# Patient Record
Sex: FEMALE | Race: WHITE
[De-identification: names, ages, dates, MRNs, and addresses within clinical notes are randomized per-mention and may not be internally consistent; named-entity substitution may affect disease eponyms.]

---
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
## 2020-03-08 NOTE — EDPHYS
Physician Documentation                                                                           

 [facility]                                                                 

Name: Cady Jason                                                                                   

Age: 19 yrs                                                                                       

Sex: Female                                                                                       

: 2000                                                                                   

MRN: [id number]                                                                                   

Arrival Date: 2020                                                                          

Time: 12:24                                                                                       

Account#: [id number]                                                                            

Bed 20                                                                                            

Private MD:                                                                                       

ED Physician Anant Alonso                                                                         

HPI:                                                                                              

                                                                                             

12:58 This 19 yrs old  Female presents to ER via Ambulatory with complaints of Mouth la1 

      Problem.                                                                                    

12:58 The patient presents with pain. The problem is located in the upper left third molar    la1 

      and lower left third molar. Onset: The symptoms/episode began/occurred yesterday.           

      Duration: The symptoms are continuous. Modifying factors: The symptoms are alleviated       

      by nothing, the symptoms are aggravated by chewing, swallowing. Associated signs and        

      symptoms: Pertinent positives: pain, Pertinent negatives: chills, fever, inability to       

      eat, nausea. Severity of symptoms: At their worst the symptoms were mild. The patient       

      has not experienced similar symptoms in the past.                                           

                                                                                                  

Historical:                                                                                       

- Allergies:                                                                                      

12:44 No Known Allergies;                                                                     ph  

- PMHx:                                                                                           

12:44 ADD/ADHD; PCOS; Bipolar disorder; Schizophrenia;                                        ph  

- PSHx:                                                                                           

12:44 None;                                                                                   ph  

                                                                                                  

- Immunization history:: Adult Immunizations unknown.                                             

- Social history:: Smoking status: Patient reports the use of cigarette tobacco                   

  products, smokes one pack cigarettes per day.                                                   

                                                                                                  

                                                                                                  

ROS:                                                                                              

12:59 Constitutional: Negative for fever, chills, and weight loss, Eyes: Negative for injury, la1 

      pain, redness, and discharge.                                                               

12:59 Neck: Negative for injury, pain, and swelling, Cardiovascular: Negative for chest pain,     

      palpitations, and edema, Respiratory: Negative for shortness of breath, cough,              

      wheezing, and pleuritic chest pain, Abdomen/GI: Negative for abdominal pain, nausea,        

      vomiting, diarrhea, and constipation, MS/Extremity: Negative for injury and deformity,      

      Skin: Negative for injury, rash, and discoloration, Neuro: Negative for headache,           

      weakness, numbness, tingling, and seizure.                                                  

12:59 ENT: Positive for dental pain.                                                              

                                                                                                  

Exam:                                                                                             

13:00 Constitutional:  This is a well developed, well nourished patient who is awake, alert,  la1 

      and in no acute distress. Head/Face:  Normocephalic, atraumatic.                            

13:00 Neck:  Trachea midline, no cervical lymphadenopathy.  Supple, full range of motion          

      without nuchal rigidity, or vertebral point tenderness.  No Meningismus. Respiratory:       

       No increased work of breathing.                                                            

13:00 ENT: Ear canal(s): are normal, clear, TM's: are normal, Mouth: Lips: normal, Oral           

      mucosa: normal, pink and intact, Gums: normal with healthy appearance, Tongue: is           

      normal, abscess, is not appreciated, drooling, is not appreciated, mild trismus, still      

      able to open enough for exam and PO food/water, Posterior pharynx: Airway: normal,          

      Tonsils: are normal in appearance, Uvula: normal, midline, swelling, is not                 

      appreciated, erythema, is not appreciated, peritonsillar mass, is not appreciated,          

      pooling of secretions, is not appreciated, Dental exam: pain, that is moderate,             

      specifically in the  upper left third molar (#16) and lower left third molar (#17),         

      Voice: is normal.                                                                           

                                                                                                  

Vital Signs:                                                                                      

12:42  / 87; Pulse 93; Resp 16; Temp 98.4; Pulse Ox 97% on R/A; Weight 56.7 kg; Height  ph  

      4 ft. 10 in. (147.32 cm);                                                                   

12:45  / 71; Pulse 93; Resp 17; Temp 98.0(O); Pulse Ox 98% on R/A; Weight 54.43 kg (R); rb1 

      Height 5 ft. 2 in. (157.48 cm) (R); Pain 10/10;                                             

13:30  / 91; Pulse 94; Resp 17; Pulse Ox 99% ; Pain 10/10;                              rb1 

12:45 Body Mass Index 21.95 (54.43 kg, 157.48 cm)                                             rb1 

                                                                                                  

MDM:                                                                                              

12:50 Patient medically screened.                                                             la1 

13:01 Data reviewed: vital signs, nurses notes. Counseling: I had a detailed discussion with  la1 

      the patient and/or guardian regarding: the historical points, exam findings, and any        

      diagnostic results supporting the discharge/admit diagnosis, the need for outpatient        

      follow up, a dentist, to return to the emergency department if symptoms worsen or           

      persist or if there are any questions or concerns that arise at home. Special               

      discussion: Based on the history and exam findings, there is no indication for further      

      emergent testing or inpatient evaluation. I discussed with the patient/guardian the         

      need to see a dentist for further evaluation of the symptoms. ED course: patient with       

      pain in left upper and lower jaw. Tenderness to palpation of external left cheek,           

      concern for early infection/abscess formation although pain could be from wisdom teeth      

      eruption. Due to mild trismus and tenderness I will treat with abx and recommend FU         

      with dentist. Pt does not have and submandibular erythema or edema, no sublingual           

      edema..                                                                                     

                                                                                                  

Administered Medications:                                                                         

13:37 Drug: Ibuprofen 600 mg Route: PO;                                                       rb1 

13:40 Follow up: Response: Medication administered at discharge.                              rb1 

                                                                                                  

                                                                                                  

Disposition:                                                                                      

14:19 Co-signature as Attending Physician, Anant Alonso MD.                                    rn  

                                                                                                  

Disposition:                                                                                      

20 13:04 Discharged to Home. Impression: Dental pain.                                       

- Condition is Stable.                                                                            

- Discharge Instructions: Dental Pain, Dental Pain, Easy-to-Read, Preventive Dental               

  Care, Adult.                                                                                    

- Prescriptions for Augmentin 875- 125 mg Oral Tablet - take 1 tablet by ORAL route               

  every 12 hours for 10 days; 20 tablet.                                                          

- Medication Reconciliation Form, Thank You Letter, Antibiotic Education form.                    

- Follow up: Private Physician; When: 2 - 3 days; Reason: Recheck today's complaints,             

  Re-evaluation by your physician.                                                                

- Problem is new.                                                                                 

- Symptoms are unchanged.                                                                         

                                                                                                  

                                                                                                  

                                                                                                  

Signatures:                                                                                       

Anant Alonso MD MD   rn                                                   

Booker Willams, TRACEP-C                      FNP-Cla1                                                  

Veronica Delvalle, RACHEL                      RN                                                      

Michelle Strickland, RN                     RN   rb1                                                  

                                                                                                  

Corrections: (The following items were deleted from the chart)                                    

13:43 13:04 2020 13:04 Discharged to Home. Impression: Dental pain. Condition is        rb1 

      Stable. Forms are Medication Reconciliation Form, Thank You Letter, Antibiotic              

      Education, Prescription Opioid Use. Follow up: Private Physician; When: 2 - 3 days;         

      Reason: Recheck today's complaints, Re-evaluation by your physician. Problem is new.        

      Symptoms are unchanged. la1                                                                 

                                                                                                  

**************************************************************************************************

## 2020-03-08 NOTE — ER
Nurse's Notes                                                                                     

 [facility]                                                                 

Name: Cady Jason                                                                                   

Age: 19 yrs                                                                                       

Sex: Female                                                                                       

: 2000                                                                                   

MRN: [id number]                                                                                   

Arrival Date: 2020                                                                          

Time: 12:24                                                                                       

Account#: [id number]                                                                            

Bed 20                                                                                            

Private MD:                                                                                       

Diagnosis: Dental pain                                                                            

                                                                                                  

Presentation:                                                                                     

                                                                                             

12:42 Chief complaint: Patient states: Pain in L upper and lower molars, also reports nausea, ph  

      denies fever or vomiting. Coronavirus screen: The patient has NOT traveled to a country     

      currently being monitored by the [facility] within the last 14 days. The patient has NOT had       

      contact with any known and/or suspected case of coronavirus. Ebola Screen: No symptoms      

      or risks identified at this time. Initial Sepsis Screen: Does the patient meet any 2        

      criteria? No. Patient's initial sepsis screen is negative. Does the patient have a          

      suspected source of infection? No. Patient's initial sepsis screen is negative. Risk        

      Assessment: Do you want to hurt yourself or someone else? Patient reports no desire to      

      harm self or others.                                                                        

12:42 Method Of Arrival: Ambulatory                                                           ph  

12:42 Acuity: MELISA 5                                                                           ph  

                                                                                                  

Historical:                                                                                       

- Allergies:                                                                                      

12:44 No Known Allergies;                                                                     ph  

- PMHx:                                                                                           

12:44 ADD/ADHD; PCOS; Bipolar disorder; Schizophrenia;                                        ph  

- PSHx:                                                                                           

12:44 None;                                                                                   ph  

                                                                                                  

- Immunization history:: Adult Immunizations unknown.                                             

- Social history:: Smoking status: Patient reports the use of cigarette tobacco                   

  products, smokes one pack cigarettes per day.                                                   

                                                                                                  

                                                                                                  

Screenin:45 Abuse screen: Denies threats or abuse. Nutritional screening: No deficits noted.        rb1 

      Tuberculosis screening: No symptoms or risk factors identified. Fall Risk None              

      identified.                                                                                 

                                                                                                  

Assessment:                                                                                       

12:45 General: Appears uncomfortable, Behavior is calm, cooperative, Denies fever. Pain:      rb1 

      Complains of pain in lower left third molar (#17) and upper left third molar (#16) Pain     

      currently is 10 out of 10 on a pain scale. Neuro: Level of Consciousness is awake,          

      alert, obeys commands, Oriented to person, place, time, situation. Cardiovascular:          

      Capillary refill < 3 seconds is brisk in bilateral fingers. Respiratory: Airway is          

      patent Respiratory effort is even, unlabored, Respiratory pattern is regular,               

      symmetrical. GI: Reports nausea. : No signs and/or symptoms were reported regarding       

      the genitourinary system. EENT: Reports pain in left upper and lower jaw. Derm: Skin is     

      pink, warm \T\ dry.                                                                         

13:40 Reassessment: Patient appears in no apparent distress at this time. No changes from     rb1 

      previously documented assessment.                                                           

                                                                                                  

Vital Signs:                                                                                      

12:42  / 87; Pulse 93; Resp 16; Temp 98.4; Pulse Ox 97% on R/A; Weight 56.7 kg; Height  ph  

      4 ft. 10 in. (147.32 cm);                                                                   

12:45  / 71; Pulse 93; Resp 17; Temp 98.0(O); Pulse Ox 98% on R/A; Weight 54.43 kg (R); rb1 

      Height 5 ft. 2 in. (157.48 cm) (R); Pain 10/10;                                             

13:30  / 91; Pulse 94; Resp 17; Pulse Ox 99% ; Pain 10/10;                              rb1 

12:45 Body Mass Index 21.95 (54.43 kg, 157.48 cm)                                             rb1 

                                                                                                  

ED Course:                                                                                        

12:24 Patient arrived in ED.                                                                  ag5 

12:27 [facility]                                                             la1 

12:27 Anant Alonso MD is Attending Physician.                                                la1 

12:44 Triage completed.                                                                       ph  

12:45 Arm band placed on Patient placed in an exam room, on a stretcher.                      ph  

12:45 Patient has correct armband on for positive identification. Bed in low position. Call   rb1 

      light in reach. Side rails up X 1. Pulse ox on. NIBP on.                                    

13:10 Michelle Strickland RN is Primary Nurse.                                                   rb1 

13:43 No provider procedures requiring assistance completed. Patient did not have IV access   rb1 

      during this emergency room visit.                                                           

                                                                                                  

Administered Medications:                                                                         

13:37 Drug: Ibuprofen 600 mg Route: PO;                                                       rb1 

13:40 Follow up: Response: Medication administered at discharge.                              rb1 

                                                                                                  

                                                                                                  

Outcome:                                                                                          

13:04 Discharge ordered by MD.                                                                la1 

13:43 Patient left the ED.                                                                    rb1 

13:43 Discharged to home ambulatory, with family.                                             rb1 

13:43 Condition: stable                                                                           

13:43 Discharge instructions given to patient, Instructed on discharge instructions, follow       

      up and referral plans. medication usage, Demonstrated understanding of instructions,        

      follow-up care, medications, Prescriptions given X 1.                                       

                                                                                                  

Signatures:                                                                                       

Booker Willams FNP-C                      FNP-Cla1                                                  

Delvalle, Veronica, RN                      RN   ph                                                   

Michelle Strickland, RN                     RN   rb1                                                  

Ami, Sloan                                ag5                                                  

                                                                                                  

**************************************************************************************************

## 2020-03-24 NOTE — ER
Nurse's Notes                                                                                     

 [facility]                                                                 

Name: Cady Jason                                                                                   

Age: 19 yrs                                                                                       

Sex: Female                                                                                       

: 2000                                                                                   

MRN: [id number]                                                                                   

Arrival Date: 2020                                                                          

Time: 19:32                                                                                       

Account#: [id number]                                                                            

Bed 23                                                                                            

Private MD:                                                                                       

Diagnosis: Acute pharyngitis                                                                      

                                                                                                  

Presentation:                                                                                     

                                                                                             

19:46 Chief complaint: Patient states: that she is having on and off headaches, abd pain,     fc  

      nausea and body aches. Also has sore throat. Started 2 days ago. States that she feels      

      as if she has the flu. Denies any fever or resp distress. Coronavirus screen: Patient       

      denies fever greater than 100.4F, cough, shortness of breath, or difficulty breathing.      

      Proceed with normal triage process. Ebola Screen: Patient negative for fever greater        

      than or equal to 101.5 degrees Fahrenheit, and additional compatible Ebola Virus            

      Disease symptoms Patient denies exposure to infectious person. Patient denies travel to     

      an Ebola-affected area in the 21 days before illness onset. Initial Sepsis Screen: Does     

      the patient meet any 2 criteria? No. Patient's initial sepsis screen is negative. Does      

      the patient have a suspected source of infection? No. Patient's initial sepsis screen       

      is negative. Risk Assessment: Do you want to hurt yourself or someone else? Patient         

      reports no desire to harm self or others. Onset of symptoms was 2020. Care        

      prior to arrival: Medication(s) given: Tylenol PM last night. Transition of care:           

      patient was not received from another setting of care.                                      

19:46 Method Of Arrival: Ambulatory                                                             

19:46 Acuity: MELISA 4                                                                           fc  

                                                                                                  

OB/GYN:                                                                                           

19:50 [facility] 2020                                                                              fc  

                                                                                                  

Historical:                                                                                       

- Allergies:                                                                                      

19:50 No Known Allergies;                                                                     fc  

- Home Meds:                                                                                      

19:50 None [Active];                                                                          fc  

- PMHx:                                                                                           

19:50 ADD/ADHD; Bipolar disorder; PCOS; Schizophrenia;                                        fc  

- PSHx:                                                                                           

19:50 None;                                                                                   fc  

                                                                                                  

- Immunization history:: Last tetanus immunization: up to date.                                   

- Social history:: Smoking status: Patient reports the use of cigarette tobacco                   

  products, smokes one pack cigarettes per day. Patient uses alcohol, occasionally.               

  street drugs, marijuana.                                                                        

                                                                                                  

                                                                                                  

Screenin:05 Abuse screen: Denies threats or abuse. Denies injuries from another. Nutritional        ch2 

      screening: No deficits noted. Tuberculosis screening: No symptoms or risk factors           

      identified. Fall Risk None identified.                                                      

                                                                                                  

Assessment:                                                                                       

20:40 General: Appears in no apparent distress. comfortable, unkempt, well developed, well    ch2 

      nourished, Behavior is calm, cooperative, appropriate for age.                              

20:40 Pain: Complains of pain in throat Quality of pain is described as sore/scratchy Pain    ch2 

      began 2-3 days ago. Neuro: No deficits noted. Level of Consciousness is awake, alert,       

      obeys commands, Oriented to person, place, time, situation, Appropriate for age        

      are equal bilaterally Moves all extremities. Full function Gait is steady, Speech is        

      normal, Denies weakness blurred vision headache. Cardiovascular: No deficits noted.         

      Denies chest pain, diaphoresis, lightheadedness, nausea, shortness of breath, syncope,      

      vomiting. Respiratory: No deficits noted. Airway is patent Respiratory effort is even,      

      unlabored, Breath sounds are clear bilaterally. GI: No deficits noted. No signs and/or      

      symptoms were reported involving the gastrointestinal system. Abdomen is round              

      non-distended. : No deficits noted. No signs and/or symptoms were reported regarding      

      the genitourinary system. Denies burning with urination, cramping. EENT: Throat is          

      pink. Derm: No deficits noted. No signs and/or symptoms reported regarding the              

      dermatologic system. Musculoskeletal: No deficits noted. No signs and/or symptoms           

      reported regarding the musculoskeletal system. Denies.                                      

                                                                                                  

Vital Signs:                                                                                      

19:46  / 68; Pulse 95; Resp 18; Temp 98.4(O); Pulse Ox 100% on R/A; Weight 68.04 kg     fc  

      (R); Height 4 ft. 10 in. (147.32 cm) (R); Pain 6/10;                                        

21:05 Temp 98.7; Pain 0/10;                                                                   ch2 

19:46 Body Mass Index 31.35 (68.04 kg, 147.32 cm)                                               

                                                                                                  

ED Course:                                                                                        

19:32 Patient arrived in ED.                                                                  cl3 

19:33 [facility]C is [facility]                                                        kb  

19:33 Yovany Wilcox MD is Attending Physician.                                             kb  

19:49 Triage completed.                                                                       fc  

19:50 Arm band placed on Patient placed in an exam room, on a stretcher.                      fc  

20:05 Flu and/or RSV swab sent to lab. Strep swab sent to lab.                                jp3 

21:06 No provider procedures requiring assistance completed. Patient did not have IV access   ch2 

      during this emergency room visit.                                                           

21:07 Bed in low position. Call light in reach.                                               ch2 

                                                                                                  

Administered Medications:                                                                         

No medications were administered                                                                  

                                                                                                  

                                                                                                  

Outcome:                                                                                          

20:36 Discharge ordered by MD.                                                                jojo  

21:06 Discharged to home ambulatory.                                                          ch2 

21:06 Condition: good                                                                             

21:06 Discharge instructions given to patient, Instructed on discharge instructions, follow       

      up and referral plans.                                                                      

21:07 Patient left the ED.                                                                    ch2 

                                                                                                  

Signatures:                                                                                       

Cintia Colindres FNP-C FNP-Jessica Carmona, RN                   RN   fc                                                   

Altagracia Ellis RN                      RN   ch2                                                  

Morales Ramírez                              jp3                                                  

Salud Rosales                                cl3                                                  

                                                                                                  

**************************************************************************************************

## 2020-03-24 NOTE — EDPHYS
Physician Documentation                                                                           

 [facility]                                                                 

Name: Cady Jason                                                                                   

Age: 19 yrs                                                                                       

Sex: Female                                                                                       

: 2000                                                                                   

MRN: [id number]                                                                                   

Arrival Date: 2020                                                                          

Time: 19:32                                                                                       

Account#: [id number]                                                                            

Bed 23                                                                                            

Private MD:                                                                                       

ANABELL[facility] Physician Yovany Wilcox                                                                      

HPI:                                                                                              

                                                                                             

20:41 This 19 yrs old  Female presents to ER via Ambulatory with complaints of Sore  kb  

      Throat, Headache.                                                                           

20:41 The patient presents with sore throat. The patient describes throat pain as constant.   kb  

      Onset: The symptoms/episode began/occurred today. Severity of symptoms: At their worst      

      the symptoms were mild, moderate, in the emergency department the symptoms are              

      unchanged. Modifying factors: The symptoms are alleviated by nothing, the symptoms are      

      aggravated by nothing, Patient's oral intake status: good. Associated signs and             

      symptoms: Pertinent positives: flu-like symptoms, myalgias, nausea, Sore throat             

      Pertinent negatives fever. The patient has not experienced similar symptoms in the          

      past. The patient has not recently seen a physician.                                        

                                                                                                  

OB/GYN:                                                                                           

19:50 LMP 2020                                                                              fc  

                                                                                                  

Historical:                                                                                       

- Allergies:                                                                                      

19:50 No Known Allergies;                                                                     fc  

- Home Meds:                                                                                      

19:50 None [Active];                                                                          fc  

- PMHx:                                                                                           

19:50 ADD/ADHD; Bipolar disorder; PCOS; Schizophrenia;                                        fc  

- PSHx:                                                                                           

19:50 None;                                                                                   fc  

                                                                                                  

- Immunization history:: Last tetanus immunization: up to date.                                   

- Social history:: Smoking status: Patient reports the use of cigarette tobacco                   

  products, smokes one pack cigarettes per day. Patient uses alcohol, occasionally.               

  street drugs, marijuana.                                                                        

                                                                                                  

                                                                                                  

ROS:                                                                                              

20:37 Constitutional: Negative for fever, chills, and weight loss, Neck: Negative for injury, kb  

      pain, and swelling, Cardiovascular: Negative for chest pain, palpitations, and edema,       

      Respiratory: Negative for shortness of breath, cough, wheezing, and pleuritic chest         

      pain, Abdomen/GI: Negative for abdominal pain, vomiting, diarrhea, and constipation.        

      +nausea Back: Negative for injury and pain, MS/Extremity: Negative for injury and           

      deformity, Skin: Negative for injury, rash, and discoloration, Neuro: Negative for          

      headache, weakness, numbness, tingling, and seizure.                                        

20:37 Constitutional: Positive for body aches.                                                    

20:37 ENT: Positive for sore throat.                                                              

                                                                                                  

Exam:                                                                                             

20:37 Constitutional:  This is a well developed, well nourished patient who is awake, alert,  kb  

      and in no acute distress. Head/Face:  Normocephalic, atraumatic. ENT:  Nares patent. No     

      nasal discharge, no septal abnormalities noted.  Tympanic membranes are normal and          

      external auditory canals are clear.  Oropharynx with no redness, swelling, or masses,       

      exudates, or evidence of obstruction, uvula midline.  Mucous membranes moist. Neck:         

      Trachea midline, no thyromegaly or masses palpated, and no cervical lymphadenopathy.        

      Supple, full range of motion without nuchal rigidity, or vertebral point tenderness.        

      No Meningismus. Chest/axilla:  Normal chest wall appearance and motion.  Nontender with     

      no deformity.  No lesions are appreciated. Cardiovascular:  Regular rate and rhythm         

      with a normal S1 and S2.  No gallops, murmurs, or rubs.  Normal PMI, no JVD.  No pulse      

      deficits. Respiratory:  Lungs have equal breath sounds bilaterally, clear to                

      auscultation and percussion.  No rales, rhonchi or wheezes noted.  No increased work of     

      breathing, no retractions or nasal flaring. Abdomen/GI:  Soft, non-tender, with normal      

      bowel sounds.  No distension or tympany.  No guarding or rebound.  No evidence of           

      tenderness throughout. Skin:  Warm, dry with normal turgor.  Normal color with no           

      rashes, no lesions, and no evidence of cellulitis. MS/ Extremity:  Pulses equal, no         

      cyanosis.  Neurovascular intact.  Full, normal range of motion. Neuro:  Awake and           

      alert, GCS 15, oriented to person, place, time, and situation.  Cranial nerves II-XII       

      grossly intact.  Motor strength 5/5 in all extremities.  Sensory grossly intact.            

      Cerebellar exam normal.  Normal gait.                                                       

                                                                                                  

Vital Signs:                                                                                      

19:46  / 68; Pulse 95; Resp 18; Temp 98.4(O); Pulse Ox 100% on R/A; Weight 68.04 kg     fc  

      (R); Height 4 ft. 10 in. (147.32 cm) (R); Pain 6/10;                                        

21:05 Temp 98.7; Pain 0/10;                                                                   ch2 

19:46 Body Mass Index 31.35 (68.04 kg, 147.32 cm)                                               

                                                                                                  

MDM:                                                                                              

19:52 Patient medically screened.                                                             kb  

20:35 Data reviewed: vital signs, nurses notes. Data interpreted: Pulse oximetry: on room air kb  

      is 100 %. Interpretation: normal. Counseling: I had a detailed discussion with the          

      patient and/or guardian regarding: the historical points, exam findings, and any            

      diagnostic results supporting the discharge/admit diagnosis, lab results, the need for      

      outpatient follow up, a family practitioner, to return to the emergency department if       

      symptoms worsen or persist or if there are any questions or concerns that arise at home.    

                                                                                                  

                                                                                             

19:53 Order name: Flu; Complete Time: 20:35                                                   kb  

                                                                                             

19:53 Order name: Strep; Complete Time: 20:35                                                 kb  

                                                                                             

20:31 Order name: Throat Culture                                                              EDMS

                                                                                                  

Administered Medications:                                                                         

No medications were administered                                                                  

                                                                                                  

                                                                                                  

Disposition:                                                                                      

                                                                                             

07:31 Co-signature as Attending Physician, Yovany Wilcox MD I agree with the assessment and  [facility] 

      plan of care.                                                                               

                                                                                                  

Disposition:                                                                                      

20 20:36 Discharged to Home. Impression: Acute pharyngitis.                                 

- Condition is Stable.                                                                            

- Discharge Instructions: Pharyngitis, Easy-to-Read, Viral Respiratory Infection,                 

  Easy-To-Read.                                                                                   

                                                                                                  

- Medication Reconciliation Form, Thank You Letter, Antibiotic Education, Prescription            

  Opioid Use form.                                                                                

- Follow up: Emergency Department; When: As needed; Reason: Worsening of condition.               

  Follow up: Private Physician; When: 2 - 3 days; Reason: Recheck today's complaints,             

  Continuance of care, Re-evaluation by your physician.                                           

                                                                                                  

                                                                                                  

                                                                                                  

Signatures:                                                                                       

Dispatcher [employer]                           EDMS                                                 

Cintia Colindres, Yovany Florian MD MD cha Chretien, Felicia RN                   RN                                                      

Altagracia Ellis RN                      RN   ch2                                                  

                                                                                                  

Corrections: (The following items were deleted from the chart)                                    

                                                                                             

21:07 20:36 2020 20:36 Discharged to Home. Impression: Acute pharyngitis. Condition is  ch2 

      Stable. Forms are Medication Reconciliation Form, Thank You Letter, Antibiotic              

      Education, Prescription Opioid Use. Follow up: Emergency Department; When: As needed;       

      Reason: Worsening of condition. Follow up: Private Physician; When: 2 - 3 days; Reason:     

      Recheck today's complaints, Continuance of care, Re-evaluation by your physician. kb        

                                                                                                  

**************************************************************************************************

## 2021-11-19 NOTE — RAD REPORT
EXAM DESCRIPTION:  CT - Abdomen   Pelvis W Contrast - 11/19/2021 4:57 pm

 

CLINICAL HISTORY:  Abdominal pain

 

COMPARISON:  none.

 

TECHNIQUE:  Computed axial tomography of the abdomen pelvis was obtained. 100 cc Isovue-300 was admin
istered intravenously. Oral contrast was not requested which limits evaluation of bowel.

 

All CT scans are performed using dose optimization technique as appropriate and may include automated
 exposure control or mA/KV adjustment according to patient size.

 

FINDINGS:  The liver, spleen, pancreas, adrenal and kidneys appear unremarkable.

 

There is no evidence of diverticulitis. Normal appendix.

 

4 centimeter left ovarian cyst without significant free fluid

 

Moderate amount stool within the colon

 

IMPRESSION:  4 centimeter left ovarian cyst without significant free fluid

## 2021-11-19 NOTE — ER
Nurse's Notes                                                                                     

 [facility]                                                                 

Name: Cady Jason                                                                                   

Age: 21 yrs                                                                                       

Sex: Female                                                                                       

: 2000                                                                                   

MRN: [id number]                                                                                   

Arrival Date: 2021                                                                          

Time: 15:11                                                                                       

Account#: [id number]                                                                            

Bed 11                                                                                            

Private MD:                                                                                       

Diagnosis: Other ovarian cysts;Constipation;UTI/ Urinary tract infection, site not specified      

                                                                                                  

Presentation:                                                                                     

                                                                                             

15:22 Chief complaint: Patient states: passed a piece of tissue that looked like a shrimp     iw  

      with nubs about a month ago, had a lot of pain when that happened, thinks it was a          

      miscarriage, she still hasn't had a period since then and is now having pain across her     

      abdomen. Coronavirus screen: At this time, the client does not indicate any symptoms        

      associated with coronavirus-19. Ebola Screen: Patient negative for fever greater than       

      or equal to 101.5 degrees Fahrenheit, and additional compatible Ebola Virus Disease         

      symptoms Patient denies exposure to infectious person. Patient denies travel to an          

      Ebola-affected area in the 21 days before illness onset. No symptoms or risks               

      identified at this time. Initial Sepsis Screen: Does the patient meet any 2 criteria?       

      No. Patient's initial sepsis screen is negative. Does the patient have a suspected          

      source of infection? No. Patient's initial sepsis screen is negative. Risk Assessment:      

      Do you want to hurt yourself or someone else? Patient reports no desire to harm self or     

      others. Onset of symptoms was 2021.                                            

15:22 Method Of Arrival: Ambulatory                                                             

15:22 Acuity: MELISA 3                                                                           iw  

                                                                                                  

OB/GYN:                                                                                           

15:27 LMP 2021                                                                                

                                                                                                  

Historical:                                                                                       

- Allergies:                                                                                      

15:26 No Known Allergies;                                                                     iw  

- Home Meds:                                                                                      

15:26 None [Active];                                                                          iw  

- PMHx:                                                                                           

15:26 ADD/ADHD; Bipolar disorder; PCOS; Schizophrenia;                                        iw  

- PSHx:                                                                                           

15:26 None;                                                                                   iw  

                                                                                                  

- Immunization history:: Client reports having NOT received the Covid vaccine.                    

- Social history:: Smoking status: Patient reports the use of cigarette tobacco                   

  products, smokes one-half pack cigarettes per day.                                              

                                                                                                  

                                                                                                  

Screenin:16 Abuse screen: Denies threats or abuse. Denies injuries from another. Nutritional        aj1 

      screening: No deficits noted. Tuberculosis screening: No symptoms or risk factors           

      identified.                                                                                 

18:29 Fall Risk None identified.                                                              aj1 

                                                                                                  

Assessment:                                                                                       

16:16 General: Appears in no apparent distress. comfortable, Behavior is calm, cooperative,   aj1 

      appropriate for age. Pain: Complains of pain in abdomen. Neuro: Level of Consciousness      

      is awake, alert, obeys commands, Oriented to person, place, time, situation.                

      Cardiovascular: Patient's skin is warm and dry. Respiratory: Airway is patent               

      Respiratory effort is even, unlabored, Respiratory pattern is regular, symmetrical. GI:     

      Abdomen is flat, non-distended, Bowel sounds present X 4 quads. Abd is soft X 4 quads.      

      : No signs and/or symptoms were reported regarding the genitourinary system. EENT: No     

      signs and/or symptoms were reported regarding the EENT system. Derm: No signs and/or        

      symptoms reported regarding the dermatologic system. Skin is pink, warm \T\ dry. normal.    

      Musculoskeletal: No signs and/or symptoms reported regarding the musculoskeletal            

      system. Circulation, motion, and sensation intact.                                          

17:27 Reassessment: Patient appears in no apparent distress at this time. No changes from     aj1 

      previously documented assessment. Patient and/or family updated on plan of care and         

      expected duration. Pain level reassessed. Patient is alert, oriented x 3, equal             

      unlabored respirations, skin warm/dry/pink.                                                 

18:28 Reassessment: Patient appears in no apparent distress at this time. No changes from     aj1 

      previously documented assessment. Patient and/or family updated on plan of care and         

      expected duration. Pain level reassessed. Patient is alert, oriented x 3, equal             

      unlabored respirations, skin warm/dry/pink.                                                 

                                                                                                  

Vital Signs:                                                                                      

15:22  / 73; Pulse 96; Resp 16 S; Temp 98.2; Pulse Ox 100% on R/A; Height 4 ft. 10 in.  iw  

      (147.32 cm);                                                                                

17:27  / 65; Pulse 92; Resp 18; Pulse Ox 97% on R/A;                                    aj1 

18:28  / 75; Pulse 88; Resp 16; Pulse Ox 99% on R/A;                                    aj1 

                                                                                                  

ED Course:                                                                                        

15:11 Patient arrived in ED.                                                                  mr  

15:26 Triage completed.                                                                       iw  

15:27 Arm band placed on.                                                                     iw  

15:35 [facility]int[facility] Colindres [facility]C [facility]                                                        kb  

15:35 Eugenio Triplett MD is Attending Physician.                                              kb  

15:38 Aries, Kenia, RN is Primary Nurse.                                                   aj1 

16:16 Patient has correct armband on for positive identification. Bed in low position. Call   aj1 

      light in reach. Side rails up X 1.                                                          

16:16 No provider procedures requiring assistance completed. Inserted saline lock: 20 gauge   aj1 

      in right antecubital area, using aseptic technique. Blood collected.                        

16:57 CT Abd/Pelvis - IV Contrast Only In Process Unspecified.                                EDMS

18:28 IV discontinued, intact, bleeding controlled, No redness/swelling at site. Pressure     aj1 

      dressing applied.                                                                           

                                                                                                  

Administered Medications:                                                                         

16:15 Drug: NS 0.9% 1000 ml Route: IV; Rate: 1000 ml; Site: right antecubital;                aj1 

17:26 Drug: Ketorolac 30 mg Route: IVP; Site: right antecubital;                              aj1 

17:49 Drug: Rocephin (cefTRIAXone) 1 grams Route: IV; Rate: calculated rate; Site: right      aj1 

      antecubital;                                                                                

                                                                                                  

                                                                                                  

Outcome:                                                                                          

17:28 Discharge ordered by MD.                                                                kb  

18:29 Discharged to home ambulatory.                                                          aj1 

18:29 Condition: good                                                                             

18:29 Discharge instructions given to patient, Instructed on discharge instructions, follow       

      up and referral plans. medication usage, Demonstrated understanding of instructions,        

      follow-up care, medications.                                                                

18:29 Patient left the ED.                                                                    aj1 

                                                                                                  

Addendum:                                                                                         

2021                                                                                        

     08:13 Addendum: Culture Results: Positive urine culture. No further action required. Bacteria i
w

           sensitive to prescribed antibiotic.                                                    

                                                                                                  

Signatures:                                                                                       

Dispatcher [employer]                           EDMS                                                 

Cintia Colindres, MARISSA WOODWARDP-Kenia Ferris, RN                     RN   jossue                                                  

Tamara Singh Irene, RN RN   iw                                                   

                                                                                                  

**************************************************************************************************

## 2021-11-19 NOTE — EDPHYS
Physician Documentation                                                                           

 [facility]                                                                 

Name: Cady Jason                                                                                   

Age: 21 yrs                                                                                       

Sex: Female                                                                                       

: 2000                                                                                   

MRN: [id number]                                                                                   

Arrival Date: 2021                                                                          

Time: 15:11                                                                                       

Account#: [id number]                                                                            

Bed 11                                                                                            

Private MD:                                                                                       

ED Physician Eugenio Triplett                                                                       

HPI:                                                                                              

                                                                                             

17:12 This 21 yrs old Female presents to ER via Ambulatory with complaints of Abdominal Pain. kb  

17:12 The patient presents with abdominal pain in the right upper quadrant, right lower       kb  

      quadrant. Onset: The symptoms/episode began/occurred 3 day(s) ago. The symptoms do not      

      radiate. Associated signs and symptoms: none. The symptoms are described as constant.       

      Modifying factors: The symptoms are alleviated by nothing, the symptoms are aggravated      

      by nothing. Severity of pain: At its worst the pain was moderate in the emergency           

      department the pain is unchanged. The patient has not experienced similar symptoms in       

      the past. The patient has not recently seen a physician. Pt reports abd pain for a few      

      days. Also reports she hasn't had a period in 2 months and the last time she did she        

      passed something that she has been told looked like a fetus.                                

                                                                                                  

OB/GYN:                                                                                           

15:27 LMP 2021                                                                              iw  

                                                                                                  

Historical:                                                                                       

- Allergies:                                                                                      

15:26 No Known Allergies;                                                                     iw  

- Home Meds:                                                                                      

15:26 None [Active];                                                                          iw  

- PMHx:                                                                                           

15:26 ADD/ADHD; Bipolar disorder; PCOS; Schizophrenia;                                        iw  

- PSHx:                                                                                           

15:26 None;                                                                                   iw  

                                                                                                  

- Immunization history:: Client reports having NOT received the Covid vaccine.                    

- Social history:: Smoking status: Patient reports the use of cigarette tobacco                   

  products, smokes one-half pack cigarettes per day.                                              

                                                                                                  

                                                                                                  

ROS:                                                                                              

17:12 Constitutional: Negative for fever, chills, and weight loss.                            kb  

17:12 Abdomen/GI: Positive for abdominal pain, Negative for nausea, vomiting, and diarrhea.       

17:12 All other systems are negative.                                                             

                                                                                                  

Exam:                                                                                             

17:12 Constitutional:  This is a well developed, well nourished patient who is awake, alert,  kb  

      and in no acute distress. Head/Face:  Normocephalic, atraumatic. ENT:  Moist Mucous         

      membranes Cardiovascular:  Regular rate and rhythm with a normal S1 and S2.  No             

      gallops, murmurs, or rubs.  No pulse deficits. Respiratory:  Respirations even and          

      unlabored. No increased work of breathing, no retractions or nasal flaring. Skin:           

      Warm, dry with normal turgor.  Normal color. MS/ Extremity:  Pulses equal, no cyanosis.     

       Neurovascular intact.  Full, normal range of motion. Neuro:  Awake and alert, GCS 15,      

      oriented to person, place, time, and situation. Moves all extremities. Normal gait.         

      Psych:  Awake, alert, with orientation to person, place and time.  Behavior, mood, and      

      affect are within normal limits.                                                            

17:12 Abdomen/GI: Inspection: abdomen appears normal, Bowel sounds: normal, in all quadrants,     

      Palpation: soft, in all quadrants, mild abdominal tenderness, in the right upper            

      quadrant, moderate abdominal tenderness, in the right lower quadrant.                       

                                                                                                  

Vital Signs:                                                                                      

15:22  / 73; Pulse 96; Resp 16 S; Temp 98.2; Pulse Ox 100% on R/A; Height 4 ft. 10 in.  iw  

      (147.32 cm);                                                                                

17:27  / 65; Pulse 92; Resp 18; Pulse Ox 97% on R/A;                                    aj1 

18:28  / 75; Pulse 88; Resp 16; Pulse Ox 99% on R/A;                                    aj1 

                                                                                                  

MDM:                                                                                              

15:35 Patient medically screened.                                                             kb  

17:10 Data reviewed: vital signs, nurses notes. Data interpreted: Pulse oximetry: on room air kb  

      is 100 %. Interpretation: normal. Counseling: I had a detailed discussion with the          

      patient and/or guardian regarding: the historical points, exam findings, and any            

      diagnostic results supporting the discharge/admit diagnosis, lab results, radiology         

      results, the need for outpatient follow up, a family practitioner, to return to the         

      emergency department if symptoms worsen or persist or if there are any questions or         

      concerns that arise at home.                                                                

                                                                                                  

                                                                                             

15:40 Order name: Urine Microscopic Only; Complete Time: 17:28                                kb  

                                                                                             

15:41 Order name: Urine Dipstick-Ancillary; Complete Time: 15:44                              EDMS

                                                                                             

15:43 Order name: Basic Metabolic Panel; Complete Time: 16:48                                 kb  

                                                                                             

15:43 Order name: CBC with Diff; Complete Time: 16:24                                         kb  

                                                                                             

15:43 Order name: Hepatic Function; Complete Time: 16:48                                      kb  

                                                                                             

15:43 Order name: Lipase; Complete Time: 16:48                                                kb  

                                                                                             

15:34 Order name: Urine Dipstick-Ancillary (obtain specimen); Complete Time: 15:41            iw  

                                                                                             

15:34 Order name: Urine Pregnancy Test (obtain specimen); Complete Time: 15:41                iw  

                                                                                             

15:43 Order name: CT Abd/Pelvis - IV Contrast Only; Complete Time: 17:26                      kb  

                                                                                             

15:44 Order name: Urine Pregnancy--Ancillary (enter results); Complete Time: 16:16            em1 

                                                                                             

17:28 Order name: Urine Culture                                                               EDMS

                                                                                             

15:43 Order name: IV Saline Lock; Complete Time: 16:15                                        kb  

                                                                                             

15:43 Order name: Labs collected and sent; Complete Time: 16:15                               kb  

                                                                                                  

Administered Medications:                                                                         

16:15 Drug: NS 0.9% 1000 ml Route: IV; Rate: 1000 ml; Site: right antecubital;                aj1 

17:26 Drug: Ketorolac 30 mg Route: IVP; Site: right antecubital;                              aj1 

17:49 Drug: Rocephin (cefTRIAXone) 1 grams Route: IV; Rate: calculated rate; Site: right      aj1 

      antecubital;                                                                                

                                                                                                  

                                                                                                  

Disposition:                                                                                      

18:49 Co-signature as Attending Physician, Eugenio Triplett MD I agree with the assessment and   kdr 

      plan of care.                                                                               

                                                                                                  

Disposition Summary:                                                                              

21 17:28                                                                                    

Discharge Ordered                                                                                 

      Location: Home                                                                          kb  

      Condition: Stable                                                                       kb  

      Diagnosis                                                                                   

        - Other ovarian cysts                                                                 kb  

        - Constipation                                                                        kb  

        - UTI/ Urinary tract infection, site not specified                                    kb  

      Followup:                                                                               kb  

        - With: Emergency Department                                                               

        - When: As needed                                                                          

        - Reason: Worsening of condition                                                           

      Followup:                                                                               kb  

        - With: Private Physician                                                                  

        - When: 2 - 3 days                                                                         

        - Reason: Recheck today's complaints, Continuance of care, Re-evaluation by your           

      physician                                                                                   

      Discharge Instructions:                                                                     

        - Discharge Summary Sheet                                                             kb  

        - Constipation, Adult, Easy-to-Read                                                   kb  

        - Ovarian Cyst, Easy-to-Read                                                          kb  

        - Urinary Tract Infection, Adult, Easy-to-Read                                        kb  

      Forms:                                                                                      

        - Medication Reconciliation Form                                                      kb  

        - Thank You Letter                                                                    kb  

        - Antibiotic Education                                                                kb  

        - Prescription Opioid Use                                                             kb  

      Prescriptions:                                                                              

        - Ibuprofen 800 mg Oral Tablet                                                             

            - take 1 tablet by ORAL route every 8 hours As needed take with food; 30 tablet;  kb  

      Refills: 0, Product Selection Permitted                                                     

        - Macrobid 100 mg Oral Capsule                                                             

            - take 1 capsule by ORAL route every 12 hours for 10 days; 20 capsule; Refills:   kb  

      0, Product Selection Permitted                                                              

Signatures:                                                                                       

Dispatcher [employer]                           Cintia Evans, FNP-C                 FNP-Kenia Ferris, RN                     RN   aj1                                                  

Eugenio Triplett MD MD   kdr                                                  

Lakesha Kay, RN                     RN   iw                                                   

                                                                                                  

**************************************************************************************************